# Patient Record
Sex: FEMALE | Race: WHITE | NOT HISPANIC OR LATINO | URBAN - METROPOLITAN AREA
[De-identification: names, ages, dates, MRNs, and addresses within clinical notes are randomized per-mention and may not be internally consistent; named-entity substitution may affect disease eponyms.]

---

## 2017-01-25 ENCOUNTER — OFFICE (OUTPATIENT)
Dept: URBAN - METROPOLITAN AREA CLINIC 78 | Facility: CLINIC | Age: 67
End: 2017-01-25

## 2017-01-25 VITALS
TEMPERATURE: 97.4 F | SYSTOLIC BLOOD PRESSURE: 117 MMHG | HEIGHT: 65 IN | DIASTOLIC BLOOD PRESSURE: 73 MMHG | WEIGHT: 114 LBS | HEART RATE: 95 BPM

## 2017-01-25 DIAGNOSIS — R10.84 GENERALIZED ABDOMINAL PAIN: ICD-10-CM

## 2017-01-25 DIAGNOSIS — R63.4 ABNORMAL WEIGHT LOSS: ICD-10-CM

## 2017-01-25 DIAGNOSIS — Z86.010 PERSONAL HISTORY OF COLONIC POLYPS: ICD-10-CM

## 2017-01-25 DIAGNOSIS — Z80.0 FAMILY HISTORY OF MALIGNANT NEOPLASM OF DIGESTIVE ORGANS: ICD-10-CM

## 2017-01-25 PROCEDURE — 99244 OFF/OP CNSLTJ NEW/EST MOD 40: CPT

## 2017-01-25 NOTE — SERVICEHPINOTES
JOSE ROBERTO MCADAMS   is a   66   year old    female who is being seen in consultation at the request of   GINGER VALENCIA   for colonoscopy. She had a colonoscopy in 2009 at -sigmoid colon quite tortuous, external assistance using manual pressure was required, medium sized internal hemorrhoids, and serrated adenoma. Her mother had colon cancer. She has a BM daily as long as she drinks a smoothie every morning. Stools are [place] stool scale type 4 and rarely type 1 and 6.  Noticed mucous in stools with low back pain in November, but none since then. She was also experiencing abdominal pain that radiated to the back with the mucous stools. She requests to have an abdominal ultrasound to look at her gallbladder. She really hasn't had abdominal pain since then. No blood present unless her hemorrhoids act up. She has lost weight (about #20) since November she went from a size 7 to 3. She is a smoker and had  Ct of the chest w/ contrast 1/3/17-with findings of a single lung nodule in left lung base unchanged from 2006, no adenopathy.  VINOD Sanchez is currently being evaluated from rheumatologist for possible connective tissue disorder.

## 2017-02-01 ENCOUNTER — OFFICE (OUTPATIENT)
Dept: URBAN - METROPOLITAN AREA CLINIC 32 | Facility: CLINIC | Age: 67
End: 2017-02-01

## 2017-02-01 ENCOUNTER — INDEPENDENT LABORATORY (OUTPATIENT)
Dept: URBAN - METROPOLITAN AREA PATHOLOGY 17 | Facility: PATHOLOGY | Age: 67
End: 2017-02-01

## 2017-02-01 ENCOUNTER — OFFICE (OUTPATIENT)
Dept: URBAN - METROPOLITAN AREA CLINIC 32 | Facility: CLINIC | Age: 67
End: 2017-02-01
Payer: COMMERCIAL

## 2017-02-01 VITALS
DIASTOLIC BLOOD PRESSURE: 76 MMHG | RESPIRATION RATE: 16 BRPM | OXYGEN SATURATION: 95 % | SYSTOLIC BLOOD PRESSURE: 125 MMHG | TEMPERATURE: 97.5 F | SYSTOLIC BLOOD PRESSURE: 130 MMHG | OXYGEN SATURATION: 99 % | HEIGHT: 65 IN | RESPIRATION RATE: 14 BRPM | HEART RATE: 97 BPM | DIASTOLIC BLOOD PRESSURE: 81 MMHG | HEART RATE: 90 BPM | TEMPERATURE: 98.4 F | WEIGHT: 114 LBS

## 2017-02-01 DIAGNOSIS — R63.4 ABNORMAL WEIGHT LOSS: ICD-10-CM

## 2017-02-01 DIAGNOSIS — Z80.0 FAMILY HISTORY OF MALIGNANT NEOPLASM OF DIGESTIVE ORGANS: ICD-10-CM

## 2017-02-01 DIAGNOSIS — D12.0 BENIGN NEOPLASM OF CECUM: ICD-10-CM

## 2017-02-01 DIAGNOSIS — Z86.010 PERSONAL HISTORY OF COLONIC POLYPS: ICD-10-CM

## 2017-02-01 PROCEDURE — 00810: CPT | Mod: AA,QS

## 2017-02-01 PROCEDURE — 00810: CPT | Mod: QS,AA

## 2017-02-01 PROCEDURE — 88305 TISSUE EXAM BY PATHOLOGIST: CPT

## 2017-10-05 ENCOUNTER — OFFICE (OUTPATIENT)
Dept: URBAN - METROPOLITAN AREA CLINIC 78 | Facility: CLINIC | Age: 67
End: 2017-10-05

## 2017-10-05 VITALS
DIASTOLIC BLOOD PRESSURE: 75 MMHG | SYSTOLIC BLOOD PRESSURE: 110 MMHG | WEIGHT: 119 LBS | HEIGHT: 65 IN | TEMPERATURE: 96.8 F | HEART RATE: 80 BPM

## 2017-10-05 DIAGNOSIS — K86.2 CYST OF PANCREAS: ICD-10-CM

## 2017-10-05 PROCEDURE — 99213 OFFICE O/P EST LOW 20 MIN: CPT

## 2017-10-05 NOTE — SERVICEHPINOTES
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 She had incidental finding of cyst on U/S in January with f/u MRI showing benign-appearing 5 mm cyst with 6-month f/u U/S advised. She just had that done in August showing stable cyst. She is otherwise doing well, denies any other complaints today. Smokes 4 cigarettes per day. Her weight is stable/up a few pounds since last seen in January. She had her colonoscopy in February with adenomatous polyp and 5-year recall advised.